# Patient Record
Sex: FEMALE | Race: WHITE | URBAN - METROPOLITAN AREA
[De-identification: names, ages, dates, MRNs, and addresses within clinical notes are randomized per-mention and may not be internally consistent; named-entity substitution may affect disease eponyms.]

---

## 2019-04-17 ENCOUNTER — Encounter (OUTPATIENT)
Dept: URBAN - METROPOLITAN AREA CLINIC 71 | Facility: CLINIC | Age: 64
End: 2019-04-17
Payer: COMMERCIAL

## 2019-04-17 PROCEDURE — 92012 INTRM OPH EXAM EST PATIENT: CPT | Performed by: OPTOMETRIST

## 2019-05-17 ENCOUNTER — NEW PATIENT (OUTPATIENT)
Dept: URBAN - METROPOLITAN AREA CLINIC 56 | Facility: CLINIC | Age: 64
End: 2019-05-17
Payer: COMMERCIAL

## 2019-05-17 DIAGNOSIS — H15.121 NODULAR EPISCLERITIS, RIGHT EYE: Primary | ICD-10-CM

## 2019-05-17 PROCEDURE — 92004 COMPRE OPH EXAM NEW PT 1/>: CPT | Performed by: OPTOMETRIST

## 2019-05-17 RX ORDER — LOTEPREDNOL ETABONATE 5 MG/G
0.5 % GEL OPHTHALMIC
Qty: 1 | Refills: 0 | Status: INACTIVE
Start: 2019-05-17 | End: 2019-09-16

## 2019-05-17 ASSESSMENT — KERATOMETRY
OS: 44.79
OD: 45.80

## 2019-05-17 ASSESSMENT — INTRAOCULAR PRESSURE
OD: 19
OS: 15

## 2019-05-28 ENCOUNTER — Encounter (OUTPATIENT)
Dept: URBAN - METROPOLITAN AREA CLINIC 71 | Facility: CLINIC | Age: 64
End: 2019-05-28
Payer: COMMERCIAL

## 2019-05-28 PROCEDURE — 99214 OFFICE O/P EST MOD 30 MIN: CPT | Performed by: OPTOMETRIST

## 2019-07-09 ENCOUNTER — Encounter (OUTPATIENT)
Dept: URBAN - METROPOLITAN AREA CLINIC 71 | Facility: CLINIC | Age: 64
End: 2019-07-09
Payer: COMMERCIAL

## 2019-07-09 PROCEDURE — 92012 INTRM OPH EXAM EST PATIENT: CPT | Performed by: OPTOMETRIST

## 2019-09-16 ENCOUNTER — FOLLOW UP ESTABLISHED (OUTPATIENT)
Dept: URBAN - METROPOLITAN AREA CLINIC 56 | Facility: CLINIC | Age: 64
End: 2019-09-16
Payer: COMMERCIAL

## 2019-09-16 PROCEDURE — 92014 COMPRE OPH EXAM EST PT 1/>: CPT | Performed by: OPTOMETRIST

## 2019-09-16 ASSESSMENT — KERATOMETRY
OD: 45.61
OS: 44.35

## 2019-09-16 ASSESSMENT — INTRAOCULAR PRESSURE
OS: 17
OD: 18

## 2019-09-16 ASSESSMENT — VISUAL ACUITY
OS: 20/20
OD: 20/20

## 2020-09-11 ENCOUNTER — OFFICE VISIT (OUTPATIENT)
Dept: URBAN - METROPOLITAN AREA CLINIC 71 | Facility: CLINIC | Age: 65
End: 2020-09-11
Payer: COMMERCIAL

## 2020-09-11 DIAGNOSIS — H52.03 HYPERMETROPIA, BILATERAL: Primary | ICD-10-CM

## 2020-09-11 PROCEDURE — 92014 COMPRE OPH EXAM EST PT 1/>: CPT | Performed by: OPTOMETRIST

## 2020-09-11 ASSESSMENT — VISUAL ACUITY
OD: 20/25
OS: 20/20

## 2020-09-11 ASSESSMENT — INTRAOCULAR PRESSURE
OD: 14
OS: 15

## 2020-09-22 ENCOUNTER — OFFICE VISIT (OUTPATIENT)
Dept: URBAN - METROPOLITAN AREA CLINIC 71 | Facility: CLINIC | Age: 65
End: 2020-09-22
Payer: COMMERCIAL

## 2020-09-22 DIAGNOSIS — H10.013 ACUTE FOLLICULAR CONJUNCTIVITIS, BILATERAL: ICD-10-CM

## 2020-09-22 PROCEDURE — 92012 INTRM OPH EXAM EST PATIENT: CPT | Performed by: OPHTHALMOLOGY

## 2020-09-22 RX ORDER — TOBRAMYCIN AND DEXAMETHASONE 3; 1 MG/ML; MG/ML
SUSPENSION/ DROPS OPHTHALMIC
Qty: 1 | Refills: 0 | Status: INACTIVE
Start: 2020-09-22 | End: 2021-05-12

## 2020-09-22 ASSESSMENT — KERATOMETRY
OS: 44.88
OD: 45.75

## 2020-09-22 ASSESSMENT — INTRAOCULAR PRESSURE
OS: 9
OD: 10

## 2020-09-22 NOTE — IMPRESSION/PLAN
Impression: Acute follicular conjunctivitis, bilateral: H10.013. Plan: Will start patient on Tobradex QID OU for 10 days. Recommend patient to use warm compresses 3-4x daily for 5-10 minutes.

## 2020-09-22 NOTE — IMPRESSION/PLAN
Impression: Dry eye syndrome of bilateral lacrimal glands: H04.123. Plan: Likely the cause of patient's complaints. Recommend patient to use artificial tears OU especially while reading or focusing. Patient to return in 4 weeks to see if there is improvement. If worsening occurs, recommend patient to call in.

## 2020-10-20 ENCOUNTER — OFFICE VISIT (OUTPATIENT)
Dept: URBAN - METROPOLITAN AREA CLINIC 71 | Facility: CLINIC | Age: 65
End: 2020-10-20
Payer: COMMERCIAL

## 2020-10-20 DIAGNOSIS — H04.123 DRY EYE SYNDROME OF BILATERAL LACRIMAL GLANDS: Primary | ICD-10-CM

## 2020-10-20 PROCEDURE — 99212 OFFICE O/P EST SF 10 MIN: CPT | Performed by: OPHTHALMOLOGY

## 2020-10-20 ASSESSMENT — KERATOMETRY
OS: 44.75
OD: 45.50

## 2020-10-20 ASSESSMENT — INTRAOCULAR PRESSURE
OD: 16
OS: 16

## 2020-10-20 NOTE — IMPRESSION/PLAN
Impression: Dry eye syndrome of bilateral lacrimal glands: H04.123. Plan: Continue use of artificial tears, especially while reading or doing any actives that involve focusing, such as watching tv. Patient to call if any symptoms worsen.

## 2020-10-20 NOTE — IMPRESSION/PLAN
Impression: Age-related nuclear cataract, bilateral: H25.13. Stable. Plan: Observe. Return as scheduled with Dr. Matthew Moon.

## 2021-05-12 ENCOUNTER — OFFICE VISIT (OUTPATIENT)
Dept: URBAN - METROPOLITAN AREA CLINIC 56 | Facility: CLINIC | Age: 66
End: 2021-05-12
Payer: COMMERCIAL

## 2021-05-12 PROCEDURE — 92014 COMPRE OPH EXAM EST PT 1/>: CPT | Performed by: OPTOMETRIST

## 2021-05-12 PROCEDURE — 92134 CPTRZ OPH DX IMG PST SGM RTA: CPT | Performed by: OPTOMETRIST

## 2021-05-12 ASSESSMENT — VISUAL ACUITY
OD: 20/25
OS: 20/25

## 2021-05-12 ASSESSMENT — KERATOMETRY
OD: 45.48
OS: 44.89

## 2021-05-12 ASSESSMENT — INTRAOCULAR PRESSURE
OS: 15
OD: 15

## 2021-05-12 NOTE — IMPRESSION/PLAN
Impression: Combined forms of age-related cataract, bilateral Plan: Cataracts are affecting vision at this time. Recommend cataract surgery when they are visually significant. Patient education can have surgery at any time. RTC 1 year or sooner if necessary.

## 2021-08-30 ENCOUNTER — OFFICE VISIT (OUTPATIENT)
Dept: URBAN - METROPOLITAN AREA CLINIC 71 | Facility: CLINIC | Age: 66
End: 2021-08-30
Payer: COMMERCIAL

## 2021-08-30 DIAGNOSIS — S05.02XA CORNEAL ABRASION W/O FB OF LEFT EYE, INITIAL ENCOUNTER: Primary | ICD-10-CM

## 2021-08-30 DIAGNOSIS — H25.13 NUCLEAR SCLEROSIS CATARACT, BILATERAL: ICD-10-CM

## 2021-08-30 DIAGNOSIS — H17.89 OTHER CORNEAL SCARS AND OPACITIES: ICD-10-CM

## 2021-08-30 PROCEDURE — 99214 OFFICE O/P EST MOD 30 MIN: CPT | Performed by: OPTOMETRIST

## 2021-08-30 ASSESSMENT — INTRAOCULAR PRESSURE
OS: 13
OD: 15

## 2021-08-30 NOTE — IMPRESSION/PLAN
Impression: Corneal abrasion w/o FB of left eye, initial encounter: S05.02xA. superficial only. Epi fully intact. Plan: Discussed findings in detail with pt. Continue Thera Tears or other lubricating drops for comfort. No additional treatment recommended at this time. Call if new or worsening symptoms.

## 2021-09-13 ENCOUNTER — OFFICE VISIT (OUTPATIENT)
Dept: URBAN - METROPOLITAN AREA CLINIC 71 | Facility: CLINIC | Age: 66
End: 2021-09-13
Payer: COMMERCIAL

## 2021-09-13 DIAGNOSIS — S05.02XD CORNEAL ABRASION W/O FB OF LT EYE, SUBSEQUENT ENCOUNTER: ICD-10-CM

## 2021-09-13 PROCEDURE — 92015 DETERMINE REFRACTIVE STATE: CPT | Performed by: OPTOMETRIST

## 2021-09-13 PROCEDURE — 92014 COMPRE OPH EXAM EST PT 1/>: CPT | Performed by: OPTOMETRIST

## 2021-09-13 ASSESSMENT — INTRAOCULAR PRESSURE
OD: 12
OS: 13

## 2021-09-13 NOTE — IMPRESSION/PLAN
Impression: Nuclear sclerosis cataract, bilateral: H25.13. OD>OS Plan: Cataracts affecting vision some, but no surgery is currently recommended. The patient will monitor vision changes and contact us with any decrease in vision. Continue to monitor. Okay to schedule cataract evaluation at patient's request if she feels glasses ar no longer adequate.

## 2021-09-13 NOTE — IMPRESSION/PLAN
Impression: Dry eye syndrome of bilateral lacrimal glands: H04.123. Plan: Discussed dry eye disease. Use artificial tears up to QID. Call if worsens or no improvement.

## 2021-09-13 NOTE — IMPRESSION/PLAN
Impression: Presbyopia: H52.4. patient's vision with habitual glasses is already 20/40 OD and OS. Vision corrects into 20/20 (OS) with a change in prescription. Plan: A glasses Rx has not been given to pt today. Pt would like to wait 2-4 weeks to recheck her vision due to fluctuations. Okay to schedule refraction at pt's request. Due to 20/40 vision with habitual glasses, there are no work or driving restrictions recommended.

## 2021-09-13 NOTE — IMPRESSION/PLAN
Impression: Corneal abrasion w/o FB of lt eye, subsequent encounter: S05.02xD. Epi fully intact. Resolved. Plan: Discussed in detail with pt. No restrictions. Call with concerns or new symptoms.

## 2021-10-11 ENCOUNTER — OFFICE VISIT (OUTPATIENT)
Dept: URBAN - METROPOLITAN AREA CLINIC 71 | Facility: CLINIC | Age: 66
End: 2021-10-11
Payer: COMMERCIAL

## 2021-10-11 PROCEDURE — 92012 INTRM OPH EXAM EST PATIENT: CPT | Performed by: OPTOMETRIST

## 2021-10-11 ASSESSMENT — VISUAL ACUITY
OD: 20/20
OS: 20/20

## 2021-10-11 ASSESSMENT — INTRAOCULAR PRESSURE
OD: 13
OS: 12

## 2021-10-11 NOTE — IMPRESSION/PLAN
Impression: Presbyopia: H52.4. Bilateral. Current lenses are in poor condition. Plan: A glasses prescription has been discussed and generated. Patient to call with any concerns.

## 2021-11-01 ENCOUNTER — OFFICE VISIT (OUTPATIENT)
Dept: URBAN - METROPOLITAN AREA CLINIC 71 | Facility: CLINIC | Age: 66
End: 2021-11-01

## 2021-11-01 DIAGNOSIS — H52.4 PRESBYOPIA: Primary | ICD-10-CM

## 2021-11-01 PROCEDURE — 92015 DETERMINE REFRACTIVE STATE: CPT | Performed by: OPTOMETRIST

## 2021-11-01 PROCEDURE — V2799 MISC VISION ITEM OR SERVICE: HCPCS | Performed by: OPTOMETRIST

## 2021-11-01 ASSESSMENT — KERATOMETRY
OS: 44.75
OD: 45.50

## 2021-11-01 ASSESSMENT — INTRAOCULAR PRESSURE
OD: 14
OS: 13

## 2021-11-18 ENCOUNTER — OFFICE VISIT (OUTPATIENT)
Dept: URBAN - METROPOLITAN AREA CLINIC 56 | Facility: CLINIC | Age: 66
End: 2021-11-18
Payer: COMMERCIAL

## 2021-11-18 PROCEDURE — 99204 OFFICE O/P NEW MOD 45 MIN: CPT | Performed by: OPHTHALMOLOGY

## 2021-11-18 PROCEDURE — 92134 CPTRZ OPH DX IMG PST SGM RTA: CPT | Performed by: OPHTHALMOLOGY

## 2021-11-18 ASSESSMENT — KERATOMETRY
OD: 45.55
OS: 44.73

## 2021-11-18 ASSESSMENT — VISUAL ACUITY
OS: 20/20
OD: 20/20

## 2021-11-18 NOTE — IMPRESSION/PLAN
Impression: Nuclear sclerosis cataract, bilateral: H25.13. OD>OS Plan: Patient will continue to monitor vision. Patient will return for CEE or if notes any sudden changes in vision or loss of vision. 
dilated exam next available

## 2021-11-18 NOTE — IMPRESSION/PLAN
Impression: Dry eye syndrome of bilateral lacrimal glands: H04.123. Plan: Discussed daily artificial tears to improve with dry eye, and informed can purchase eye  drops over the counter , three to four times daily. 
Schedule for Punctul plugs next available

## 2021-11-18 NOTE — IMPRESSION/PLAN
Impression: Corneal abrasion w/o FB of lt eye, subsequent encounter: S05.02xD. Epi fully intact. Resolved.  Plan: Monitor

## 2022-01-06 ENCOUNTER — OFFICE VISIT (OUTPATIENT)
Dept: URBAN - METROPOLITAN AREA CLINIC 56 | Facility: CLINIC | Age: 67
End: 2022-01-06
Payer: COMMERCIAL

## 2022-01-06 PROCEDURE — 92134 CPTRZ OPH DX IMG PST SGM RTA: CPT | Performed by: OPHTHALMOLOGY

## 2022-01-06 PROCEDURE — 68761 CLOSE TEAR DUCT OPENING: CPT | Performed by: OPHTHALMOLOGY

## 2022-01-06 PROCEDURE — 99213 OFFICE O/P EST LOW 20 MIN: CPT | Performed by: OPHTHALMOLOGY

## 2022-01-06 ASSESSMENT — VISUAL ACUITY
OS: 20/20
OD: 20/20

## 2022-01-06 ASSESSMENT — INTRAOCULAR PRESSURE
OD: 14
OS: 14

## 2022-01-06 ASSESSMENT — KERATOMETRY
OS: 44.67
OD: 45.52

## 2022-01-06 NOTE — IMPRESSION/PLAN
Impression: Nuclear sclerosis cataract, bilateral: H25.13. OD>OS Plan: Patient will continue to monitor vision. Patient will return for CEE or if notes any sudden changes in vision or loss of vision.

## 2022-01-06 NOTE — IMPRESSION/PLAN
Impression: Dry eye syndrome of bilateral lacrimal glands: H04.123. Plan: Discussed daily artificial tears to improve with dry eye, and informed can purchase eye  drops over the counter , three to four times daily. .
Punctal plugs inserted in both lower lids today. 
(Extended Wear plugs 0.4mm / LOT [id number], 2025-09-08)

## 2022-02-04 ENCOUNTER — OFFICE VISIT (OUTPATIENT)
Dept: URBAN - METROPOLITAN AREA CLINIC 10 | Facility: CLINIC | Age: 67
End: 2022-02-04
Payer: COMMERCIAL

## 2022-02-04 PROCEDURE — 99213 OFFICE O/P EST LOW 20 MIN: CPT | Performed by: OPHTHALMOLOGY

## 2022-02-04 RX ORDER — METHYLPREDNISOLONE 4 MG/1
4 MG TABLET ORAL
Qty: 1 | Refills: 0 | Status: ACTIVE
Start: 2022-02-04

## 2022-02-04 NOTE — IMPRESSION/PLAN
Impression: Dry eye syndrome of bilateral lacrimal glands: H04.123. Plan: Discussed daily artificial tears to improve with dry eye, and informed can purchase eye  drops over the counter , three to four times daily. Punctal plugs are not in place OU. 
Gave rx for Medrol dose pack

## 2022-03-10 ENCOUNTER — OFFICE VISIT (OUTPATIENT)
Dept: URBAN - METROPOLITAN AREA CLINIC 56 | Facility: CLINIC | Age: 67
End: 2022-03-10
Payer: COMMERCIAL

## 2022-03-10 PROCEDURE — 92012 INTRM OPH EXAM EST PATIENT: CPT | Performed by: OPHTHALMOLOGY

## 2022-03-10 ASSESSMENT — VISUAL ACUITY
OD: 20/25
OS: 20/25

## 2022-03-10 ASSESSMENT — INTRAOCULAR PRESSURE
OD: 13
OS: 14

## 2022-03-15 ENCOUNTER — OFFICE VISIT (OUTPATIENT)
Dept: URBAN - METROPOLITAN AREA CLINIC 75 | Facility: CLINIC | Age: 67
End: 2022-03-15
Payer: COMMERCIAL

## 2022-03-15 DIAGNOSIS — H25.813 COMBINED FORMS OF AGE-RELATED CATARACT, BILATERAL: Primary | ICD-10-CM

## 2022-03-15 PROCEDURE — 92134 CPTRZ OPH DX IMG PST SGM RTA: CPT | Performed by: OPTOMETRIST

## 2022-03-15 PROCEDURE — 99213 OFFICE O/P EST LOW 20 MIN: CPT | Performed by: OPTOMETRIST

## 2022-03-15 ASSESSMENT — INTRAOCULAR PRESSURE
OD: 14
OS: 16

## 2022-03-15 ASSESSMENT — VISUAL ACUITY
OS: 20/20
OD: 20/30

## 2022-03-15 NOTE — IMPRESSION/PLAN
Impression: Presbyopia: H52.4. Plan: New glasses Rx were distributed today. Patient to call office with any questions or concerns. PRN. 1yr w/Ref GLS.
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
## 2022-04-25 ENCOUNTER — OFFICE VISIT (OUTPATIENT)
Dept: URBAN - METROPOLITAN AREA CLINIC 75 | Facility: CLINIC | Age: 67
End: 2022-04-25
Payer: COMMERCIAL

## 2022-04-25 DIAGNOSIS — H04.123 DRY EYE SYNDROME OF BILATERAL LACRIMAL GLANDS: Primary | ICD-10-CM

## 2022-04-25 DIAGNOSIS — H34.8322 TRIBUTARY BRANCH RETINAL VEIN OCCLUSION OF LEFT EYE, STABLE: ICD-10-CM

## 2022-04-25 DIAGNOSIS — H25.813 COMBINED FORMS OF AGE-RELATED CATARACT, BILATERAL: ICD-10-CM

## 2022-04-25 PROCEDURE — 99213 OFFICE O/P EST LOW 20 MIN: CPT | Performed by: OPTOMETRIST

## 2022-04-25 ASSESSMENT — INTRAOCULAR PRESSURE
OS: 11
OD: 13

## 2022-04-25 NOTE — IMPRESSION/PLAN
Impression: Tributary branch retinal vein occlusion of left eye, stable: G48.7302. Plan: Discussed diagnosis in detail with patient. Optos ordered. Appears to be healing. Pt states she has recently started seeing PCP for blood pressure and cholesterol. Pt denies ever losing vision.
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
## 2022-06-09 ENCOUNTER — OFFICE VISIT (OUTPATIENT)
Dept: URBAN - METROPOLITAN AREA CLINIC 75 | Facility: CLINIC | Age: 67
End: 2022-06-09
Payer: COMMERCIAL

## 2022-06-09 DIAGNOSIS — H47.10 PAPILLEDEMA: ICD-10-CM

## 2022-06-09 DIAGNOSIS — H34.8322 TRIBUTARY BRANCH RETINAL VEIN OCCLUSION OF LEFT EYE, STABLE: Primary | ICD-10-CM

## 2022-06-09 DIAGNOSIS — H40.023 OPEN ANGLE WITH BORDERLINE FINDINGS, HIGH RISK, BILATERAL: ICD-10-CM

## 2022-06-09 PROCEDURE — 99213 OFFICE O/P EST LOW 20 MIN: CPT | Performed by: OPTOMETRIST

## 2022-06-09 PROCEDURE — 92083 EXTENDED VISUAL FIELD XM: CPT | Performed by: OPTOMETRIST

## 2022-06-09 ASSESSMENT — INTRAOCULAR PRESSURE
OS: 11
OD: 12

## 2022-06-09 NOTE — IMPRESSION/PLAN
Impression: Tributary branch retinal vein occlusion of left eye, stable: O48.6666. OPTOS ordered and performed Plan: Branch Retinal Vein Occlusion (BRVO) is a blockage of one or more branches of the central retinal vein, which runs through the optic nerve. Branch Retinal Vein Occlusion symptoms include: Peripheral vision loss. Blurred or distorted central vision.

## 2022-06-09 NOTE — IMPRESSION/PLAN
Impression: Open angle with borderline findings, high risk, bilateral: H40.023. VF 24-2 & OPTOS ordered and performed. OPTOS ordered and performed Plan: Discussed glaucoma suspects do not usually need to be treated but do need close follow up monitoring for early signs of glaucoma damage. If treatment is necessary then glaucoma eye drops and pills are utilized. Glaucoma suspects have some characteristics of glaucoma, but not all, and the findings may be very subtle. No treatment recommended at this time.  

Contact the office if: Any loss of vision, eye pain, cloudy vision, photophobia, or dark spots or clouds blocking portions of your vision occurs

## 2022-06-09 NOTE — IMPRESSION/PLAN
Impression: Papilledema: H47.10. Plan: Discussed. Patient recommended to see PCP for sleep study. Papilledema is a condition in which increased pressure in or around the brain causes the part of the optic nerve inside the eye to swell. Symptoms may be fleeting disturbances in vision, headache, vomiting, or a combination.

## 2022-06-10 ENCOUNTER — OFFICE VISIT (OUTPATIENT)
Dept: URBAN - METROPOLITAN AREA CLINIC 70 | Facility: CLINIC | Age: 67
End: 2022-06-10
Payer: COMMERCIAL

## 2022-06-10 DIAGNOSIS — H34.8320 BRANCH RETINAL VEIN OCCLUSION W/ MACULAR EDEMA, LEFT EYE: Primary | ICD-10-CM

## 2022-06-10 PROCEDURE — 99214 OFFICE O/P EST MOD 30 MIN: CPT | Performed by: OPHTHALMOLOGY

## 2022-06-10 PROCEDURE — 67028 INJECTION EYE DRUG: CPT | Performed by: OPHTHALMOLOGY

## 2022-06-10 PROCEDURE — 92235 FLUORESCEIN ANGRPH MLTIFRAME: CPT | Performed by: OPHTHALMOLOGY

## 2022-06-10 PROCEDURE — 92134 CPTRZ OPH DX IMG PST SGM RTA: CPT | Performed by: OPHTHALMOLOGY

## 2022-06-10 ASSESSMENT — INTRAOCULAR PRESSURE
OD: 15
OS: 15

## 2022-06-10 NOTE — IMPRESSION/PLAN
Impression: Branch retinal vein occlusion w/ macular edema, left eye: D75.0443. Plan: Exam and OCT reveal BRVO with CME, inject Avastin today, micro pulse laser mac grid for CME.   BRVO does not need a major medical workup the 2 common causes are blood pressure and aging

## 2022-06-18 ENCOUNTER — OFFICE VISIT (OUTPATIENT)
Dept: URBAN - METROPOLITAN AREA CLINIC 71 | Facility: CLINIC | Age: 67
End: 2022-06-18
Payer: COMMERCIAL

## 2022-06-18 DIAGNOSIS — H34.8320 TRIBUTARY (BRANCH) RETINAL VEIN OCCLUSION, LEFT EYE, WITH MACULAR EDEMA: Primary | ICD-10-CM

## 2022-06-18 PROCEDURE — 99212 OFFICE O/P EST SF 10 MIN: CPT

## 2022-06-18 ASSESSMENT — INTRAOCULAR PRESSURE
OS: 12
OD: 13

## 2022-06-22 ENCOUNTER — OFFICE VISIT (OUTPATIENT)
Dept: URBAN - METROPOLITAN AREA CLINIC 71 | Facility: CLINIC | Age: 67
End: 2022-06-22
Payer: COMMERCIAL

## 2022-06-22 DIAGNOSIS — H25.813 COMBINED FORMS OF AGE-RELATED CATARACT, BILATERAL: ICD-10-CM

## 2022-06-22 DIAGNOSIS — H34.8320 TRIBUTARY (BRANCH) RETINAL VEIN OCCLUSION, LEFT EYE, WITH MACULAR EDEMA: Primary | ICD-10-CM

## 2022-06-22 PROCEDURE — 99214 OFFICE O/P EST MOD 30 MIN: CPT | Performed by: OPTOMETRIST

## 2022-06-22 ASSESSMENT — INTRAOCULAR PRESSURE
OD: 14
OS: 11

## 2022-06-22 NOTE — IMPRESSION/PLAN
Impression: Combined forms of age-related cataract, bilateral: H25.813. Plan: Cataracts are progressing and they are interfering with her vision. Discussed we should wait until after the retina is stable and then we can consider CAT SX in the future. No treatment currently recommended. The patient will monitor vision changes and contact us with any decrease in vision.

## 2022-06-22 NOTE — IMPRESSION/PLAN
Impression: Tributary (branch) retinal vein occlusion, left eye, with macular edema: H57.4076. Plan: BRVO noted on OPTOS and exam today. currently being monitored and treated  by Dr. Aries Wang. Recommend PT continue to follow with him for treatment and once the retina is stable then we can re-visit glasses or cataracts.

## 2022-07-05 ENCOUNTER — OFFICE VISIT (OUTPATIENT)
Dept: URBAN - METROPOLITAN AREA CLINIC 75 | Facility: CLINIC | Age: 67
End: 2022-07-05
Payer: COMMERCIAL

## 2022-07-05 DIAGNOSIS — H34.8320 TRIBUTARY (BRANCH) RETINAL VEIN OCCLUSION, LEFT EYE, WITH MACULAR EDEMA: ICD-10-CM

## 2022-07-05 DIAGNOSIS — H40.013 OPEN ANGLE WITH BORDERLINE FINDINGS, LOW RISK, BILATERAL: Primary | ICD-10-CM

## 2022-07-05 DIAGNOSIS — H43.823 VITREOMACULAR ADHESION, BILATERAL: ICD-10-CM

## 2022-07-05 DIAGNOSIS — H47.10 PAPILLEDEMA: ICD-10-CM

## 2022-07-05 PROCEDURE — 92134 CPTRZ OPH DX IMG PST SGM RTA: CPT | Performed by: OPTOMETRIST

## 2022-07-05 PROCEDURE — 99213 OFFICE O/P EST LOW 20 MIN: CPT | Performed by: OPTOMETRIST

## 2022-07-05 ASSESSMENT — INTRAOCULAR PRESSURE
OS: 9
OD: 10

## 2022-07-05 NOTE — IMPRESSION/PLAN
Impression: Papilledema: H47.10. Plan: Discussed. Papilledema is a condition in which increased pressure in or around the brain causes the part of the optic nerve inside the eye to swell. Symptoms may be fleeting disturbances in vision, headache, vomiting, or a combination.

## 2022-07-05 NOTE — IMPRESSION/PLAN
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 OCT ordered and performed Plan: Vitreomacular adhesion (VMA) is a human medical condition where the vitreous gel (or simply vitreous, AKA vitreous humour) of the human eye adheres to the retina in an abnormally strong manner. As the eye ages, it is common for the vitreous to separate from the retina. Monitor. Patient to contact office with any questions or concerns.

## 2022-07-05 NOTE — IMPRESSION/PLAN
Impression: Tributary (branch) retinal vein occlusion, left eye, with macular edema: X56.0885. OCT ordered and performed Plan: Branch Retinal Vein Occlusion (BRVO) is a blockage of one or more branches of the central retinal vein, which runs through the optic nerve. Branch Retinal Vein Occlusion symptoms include: Peripheral vision loss. Blurred or distorted central vision.

## 2022-07-29 ENCOUNTER — OFFICE VISIT (OUTPATIENT)
Dept: URBAN - METROPOLITAN AREA CLINIC 70 | Facility: CLINIC | Age: 67
End: 2022-07-29
Payer: COMMERCIAL

## 2022-07-29 DIAGNOSIS — H34.8320 BRANCH RETINAL VEIN OCCLUSION W/ MACULAR EDEMA, LEFT EYE: Primary | ICD-10-CM

## 2022-07-29 PROCEDURE — 67028 INJECTION EYE DRUG: CPT | Performed by: OPHTHALMOLOGY

## 2022-07-29 PROCEDURE — 92134 CPTRZ OPH DX IMG PST SGM RTA: CPT | Performed by: OPHTHALMOLOGY

## 2022-07-29 PROCEDURE — 67210 TREATMENT OF RETINAL LESION: CPT | Performed by: OPHTHALMOLOGY

## 2022-07-29 ASSESSMENT — INTRAOCULAR PRESSURE
OD: 11
OS: 12

## 2022-07-29 NOTE — IMPRESSION/PLAN
Impression: Branch retinal vein occlusion w/ macular edema, left eye: J53.3391. Plan: Exam and OCT reveal BRVO with CME. Added micro pulse laser mac grid for CME OS today . BRVO does not need a major medical workup the 2 common causes are blood pressure and aging.   AVASTIN inject today for CME

## 2022-08-05 ENCOUNTER — OFFICE VISIT (OUTPATIENT)
Dept: URBAN - METROPOLITAN AREA CLINIC 71 | Facility: CLINIC | Age: 67
End: 2022-08-05
Payer: COMMERCIAL

## 2022-08-05 DIAGNOSIS — H34.8320 BRANCH RETINAL VEIN OCCLUSION W/ MACULAR EDEMA, LEFT EYE: Primary | ICD-10-CM

## 2022-08-05 PROCEDURE — 99213 OFFICE O/P EST LOW 20 MIN: CPT

## 2022-08-05 ASSESSMENT — INTRAOCULAR PRESSURE
OS: 13
OD: 13

## 2022-08-05 NOTE — IMPRESSION/PLAN
Impression: Branch retinal vein occlusion w/ macular edema, left eye: A18.8002. Optos ordered and reviewed. Condition is stable currently. Plan: Discussed Optos findings with patient. Discussed in detail to give more time since patient just met with Dr. Rylie Domingo for Micro Pulse laser and injection for treatment. Advised patient that Avastin injection should not give any systemic reactions and is ok to continue with treatment. Patient to continue to monitor vision and if any changes in vision occur to call office immediately. Discussed the possibility to have injections or laser treatments may be a lifelong thing or potentially only 1-2 more laser/injection treatment but, to continue care as scheduled with Dr. Rylie Domingo. Patient voices understanding.

## 2022-08-25 ENCOUNTER — OFFICE VISIT (OUTPATIENT)
Dept: URBAN - METROPOLITAN AREA CLINIC 71 | Facility: CLINIC | Age: 67
End: 2022-08-25
Payer: COMMERCIAL

## 2022-08-25 DIAGNOSIS — H25.813 COMBINED FORMS OF AGE-RELATED CATARACT, BILATERAL: ICD-10-CM

## 2022-08-25 DIAGNOSIS — H34.8320 BRANCH RETINAL VEIN OCCLUSION W/ MACULAR EDEMA, LEFT EYE: Primary | ICD-10-CM

## 2022-08-25 PROCEDURE — 99213 OFFICE O/P EST LOW 20 MIN: CPT | Performed by: OPTOMETRIST

## 2022-08-25 PROCEDURE — 92134 CPTRZ OPH DX IMG PST SGM RTA: CPT | Performed by: OPTOMETRIST

## 2022-08-25 ASSESSMENT — INTRAOCULAR PRESSURE
OS: 12
OD: 13

## 2022-08-25 NOTE — IMPRESSION/PLAN
Impression: Branch retinal vein occlusion w/ macular edema, left eye: V51.3222. BRVO not secondary to trauma. Pt reports flickers are worsening. Pt is being monitored by Dr. Bria Laura and scheduled to see him tomorrow, as well as [place] with Dr. Alfreda Canavan 10/2022. Plan: OCT mac ordered and performed today. Discussed in detail with pt. Continue care as scheduled with Dr. Bria Laura.

## 2022-08-26 ENCOUNTER — OFFICE VISIT (OUTPATIENT)
Dept: URBAN - METROPOLITAN AREA CLINIC 70 | Facility: CLINIC | Age: 67
End: 2022-08-26
Payer: COMMERCIAL

## 2022-08-26 PROCEDURE — 92134 CPTRZ OPH DX IMG PST SGM RTA: CPT | Performed by: OPHTHALMOLOGY

## 2022-08-26 PROCEDURE — 99212 OFFICE O/P EST SF 10 MIN: CPT | Performed by: OPHTHALMOLOGY

## 2022-08-26 PROCEDURE — 92235 FLUORESCEIN ANGRPH MLTIFRAME: CPT | Performed by: OPHTHALMOLOGY

## 2022-08-26 ASSESSMENT — INTRAOCULAR PRESSURE
OS: 12
OD: 14

## 2022-08-26 NOTE — IMPRESSION/PLAN
Impression: Branch retinal vein occlusion w/ macular edema, left eye: N22.2861. Plan: Exam and OCT reveal BRVO with CME. POST  micro pulse laser mac grid for CME OS today . BRVO does not need a major medical workup the 2 common causes are blood pressure and aging.   AVASTIN inject 1 month ago  for CME

## 2022-10-13 ENCOUNTER — OFFICE VISIT (OUTPATIENT)
Dept: URBAN - METROPOLITAN AREA CLINIC 75 | Facility: CLINIC | Age: 67
End: 2022-10-13
Payer: COMMERCIAL

## 2022-10-13 DIAGNOSIS — H34.8320 TRIBUTARY (BRANCH) RETINAL VEIN OCCLUSION, LEFT EYE, WITH MACULAR EDEMA: Primary | ICD-10-CM

## 2022-10-13 DIAGNOSIS — H25.813 COMBINED FORMS OF AGE-RELATED CATARACT, BILATERAL: ICD-10-CM

## 2022-10-13 DIAGNOSIS — H40.013 OPEN ANGLE WITH BORDERLINE FINDINGS, LOW RISK, BILATERAL: ICD-10-CM

## 2022-10-13 PROCEDURE — 99214 OFFICE O/P EST MOD 30 MIN: CPT | Performed by: OPTOMETRIST

## 2022-10-13 ASSESSMENT — INTRAOCULAR PRESSURE
OS: 5
OD: 8

## 2022-10-13 NOTE — IMPRESSION/PLAN
Impression: Open angle with borderline findings, low risk, bilateral: H40.013. Plan: Discussed glaucoma suspects do not usually need to be treated but do need close follow up monitoring for early signs of glaucoma damage. If treatment is necessary then glaucoma eye drops and pills are utilized. Glaucoma suspects have some characteristics of glaucoma, but not all, and the findings may be very subtle. No drop treatment recommended at this time. Pt recommended green healthy diet with supplemental vitamins to promote eye health.   

Contact the office if: Any loss of vision, eye pain, cloudy vision, photophobia, or dark spots or clouds blocking portions of your vision occurs

## 2022-10-13 NOTE — IMPRESSION/PLAN
Impression: Branch retinal vein occlusion w/ macular edema, left eye: O11.2304. Optos ordered and reviewed. Condition is stable currently. Plan: Discussed Optos findings with patient. Discussed in detail resolving signs and symptoms due to slow improvement. Stable scarring Recommended pt continue injections as scheduled with Dr. Juwan Loredo

## 2022-10-21 ENCOUNTER — OFFICE VISIT (OUTPATIENT)
Dept: URBAN - METROPOLITAN AREA CLINIC 70 | Facility: CLINIC | Age: 67
End: 2022-10-21
Payer: COMMERCIAL

## 2022-10-21 DIAGNOSIS — H34.8320 BRANCH RETINAL VEIN OCCLUSION W/ MACULAR EDEMA, LEFT EYE: Primary | ICD-10-CM

## 2022-10-21 PROCEDURE — 92134 CPTRZ OPH DX IMG PST SGM RTA: CPT | Performed by: OPHTHALMOLOGY

## 2022-10-21 PROCEDURE — 99212 OFFICE O/P EST SF 10 MIN: CPT | Performed by: OPHTHALMOLOGY

## 2022-10-21 ASSESSMENT — INTRAOCULAR PRESSURE
OS: 10
OD: 15

## 2022-10-21 NOTE — IMPRESSION/PLAN
Impression: Branch retinal vein occlusion w/ macular edema, left eye: P76.2891. Plan: Exam and OCT reveal BRVO with CME. s/p micro pulse laser mac grid for CME OS. BRVO does not need a major medical workup the 2 common causes are blood pressure and aging. AVASTIN injection July 2022 for CME.   Doing well,  Continue micropulse laser every 4 months

## 2022-12-02 ENCOUNTER — OFFICE VISIT (OUTPATIENT)
Dept: URBAN - METROPOLITAN AREA CLINIC 71 | Facility: CLINIC | Age: 67
End: 2022-12-02
Payer: COMMERCIAL

## 2022-12-02 DIAGNOSIS — H34.8320 BRANCH RETINAL VEIN OCCLUSION W/ MACULAR EDEMA, LEFT EYE: ICD-10-CM

## 2022-12-02 DIAGNOSIS — H02.889 MEIBOMIAN GLAND DYSFUNCTION OF EYE: ICD-10-CM

## 2022-12-02 DIAGNOSIS — H04.123 DRY EYE SYNDROME OF BILATERAL LACRIMAL GLANDS: ICD-10-CM

## 2022-12-02 DIAGNOSIS — H11.32 SUBCONJUNCTIVAL HEMORRHAGE OF LEFT EYE: Primary | ICD-10-CM

## 2022-12-02 DIAGNOSIS — H25.813 COMBINED FORMS OF AGE-RELATED CATARACT, BILATERAL: ICD-10-CM

## 2022-12-02 PROCEDURE — 99213 OFFICE O/P EST LOW 20 MIN: CPT

## 2022-12-02 ASSESSMENT — INTRAOCULAR PRESSURE
OS: 15
OD: 17

## 2022-12-02 NOTE — IMPRESSION/PLAN
Impression: Branch retinal vein occlusion w/ macular edema, left eye: O68.3182. Plan: Branch Retinal Vein Occlusion (BRVO) is a blockage of one or more branches of the central retinal vein, which runs through the optic nerve. Branch Retinal Vein Occlusion symptoms include: Peripheral vision loss. Blurred or distorted central vision. Discussed Injections do not cause increased risk of retinal detachment.

## 2022-12-02 NOTE — IMPRESSION/PLAN
Impression: Meibomian gland dysfunction of eye: H02.889. Bilateral. Plan: Patient recommended to apply warm compresses at least QD for minimum 10 minutes. Lid scrubs and hygiene were explained. Patient instructed to use artificial tears as needed for comfort. Will continue to observe condition and or symptoms.

## 2022-12-02 NOTE — IMPRESSION/PLAN
Impression: Subconjunctival hemorrhage of left eye: H11.32. Plan: Discussed DX in detail with pt Explained that a subconj heme is a benign finding and is like a bruise on the eye. It may get worse before it gets better and it can take some time, possibly a few weeks, to fully resolve. Recommend pt to use AFT for comfort. 
Pt voices understanding

## 2022-12-16 ENCOUNTER — PROCEDURE (OUTPATIENT)
Dept: URBAN - METROPOLITAN AREA CLINIC 70 | Facility: CLINIC | Age: 67
End: 2022-12-16
Payer: COMMERCIAL

## 2022-12-16 DIAGNOSIS — H34.8320 BRANCH RETINAL VEIN OCCLUSION W/ MACULAR EDEMA, LEFT EYE: Primary | ICD-10-CM

## 2022-12-16 PROCEDURE — 92134 CPTRZ OPH DX IMG PST SGM RTA: CPT | Performed by: OPHTHALMOLOGY

## 2022-12-16 PROCEDURE — 67210 TREATMENT OF RETINAL LESION: CPT | Performed by: OPHTHALMOLOGY

## 2022-12-16 ASSESSMENT — INTRAOCULAR PRESSURE
OS: 15
OD: 15

## 2022-12-16 NOTE — IMPRESSION/PLAN
Impression: Branch retinal vein occlusion w/ macular edema, left eye: X53.1063. Plan: Exam and OCT reveal BRVO with CME. Added micro pulse laser mac grid for CME OS. BRVO does not need a major medical workup the 2 common causes are blood pressure and aging. AVASTIN injection July 2022 for CME.   Doing well,  Continue micropulse laser every 4 months

## 2022-12-29 ENCOUNTER — POST-OPERATIVE VISIT (OUTPATIENT)
Dept: URBAN - METROPOLITAN AREA CLINIC 71 | Facility: CLINIC | Age: 67
End: 2022-12-29
Payer: COMMERCIAL

## 2022-12-29 DIAGNOSIS — H43.812 VITREOUS DETACHMENT OF LEFT EYE: Primary | ICD-10-CM

## 2022-12-29 DIAGNOSIS — H34.8320 BRANCH RETINAL VEIN OCCLUSION W/ MACULAR EDEMA, LEFT EYE: ICD-10-CM

## 2022-12-29 DIAGNOSIS — H25.813 COMBINED FORMS OF AGE-RELATED CATARACT, BILATERAL: ICD-10-CM

## 2022-12-29 PROCEDURE — 99213 OFFICE O/P EST LOW 20 MIN: CPT | Performed by: PHYSICIAN ASSISTANT

## 2022-12-29 ASSESSMENT — INTRAOCULAR PRESSURE
OD: 14
OS: 14

## 2022-12-29 NOTE — IMPRESSION/PLAN
Impression: Branch retinal vein occlusion w/ macular edema, left eye: E08.8715. Plan: Appears stable. F/u with retina as scheduled.

## 2022-12-29 NOTE — IMPRESSION/PLAN
Impression: Vitreous detachment of left eye: H43.812. Plan: Posterior vitreous detachment present. There is no evidence of breaks or tears on today's exam. Pt declined dilation. Signs and risks of retinal detachment and tears were discussed. Patient instructed to call the office if any symptoms noted. Recommend the patient return to office for follow up with retina. Answered all questions and addressed all concerns. Suspect that pt will acquire new floater in the future. Optos ordered today showed no retinal pathology.

## 2022-12-29 NOTE — IMPRESSION/PLAN
Impression: Combined forms of age-related cataract, bilateral: H25.813. Plan: Noted. F/u with regular eye exams.

## 2023-04-05 ENCOUNTER — OFFICE VISIT (OUTPATIENT)
Dept: URBAN - METROPOLITAN AREA CLINIC 71 | Facility: CLINIC | Age: 68
End: 2023-04-05
Payer: MEDICARE

## 2023-04-05 DIAGNOSIS — H25.813 COMBINED FORMS OF AGE-RELATED CATARACT, BILATERAL: ICD-10-CM

## 2023-04-05 DIAGNOSIS — H34.8320 BRANCH RETINAL VEIN OCCLUSION W/ MACULAR EDEMA, LEFT EYE: Primary | ICD-10-CM

## 2023-04-05 DIAGNOSIS — H40.013 OPEN ANGLE WITH BORDERLINE FINDINGS, LOW RISK, BILATERAL: ICD-10-CM

## 2023-04-05 PROCEDURE — 99212 OFFICE O/P EST SF 10 MIN: CPT | Performed by: OPTOMETRIST

## 2023-04-05 ASSESSMENT — INTRAOCULAR PRESSURE
OD: 12
OS: 11
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
## 2023-04-05 NOTE — IMPRESSION/PLAN
Impression: Open angle with borderline findings, low risk, bilateral: H40.013. Pt being monitored by Dr. Stephen Rios. Plan: Continue care with Dr. Bruce Douglas as scheduled 04/20/23 for Glaucoma work-up.

## 2023-04-14 ENCOUNTER — OFFICE VISIT (OUTPATIENT)
Dept: URBAN - METROPOLITAN AREA CLINIC 71 | Facility: CLINIC | Age: 68
End: 2023-04-14
Payer: COMMERCIAL

## 2023-04-14 DIAGNOSIS — H34.8320 TRIBUTARY (BRANCH) RETINAL VEIN OCCLUSION, LEFT EYE, WITH MACULAR EDEMA: Primary | ICD-10-CM

## 2023-04-14 PROCEDURE — 92134 CPTRZ OPH DX IMG PST SGM RTA: CPT | Performed by: OPHTHALMOLOGY

## 2023-04-14 PROCEDURE — 99214 OFFICE O/P EST MOD 30 MIN: CPT | Performed by: OPHTHALMOLOGY

## 2023-04-14 ASSESSMENT — INTRAOCULAR PRESSURE
OD: 15
OS: 19

## 2023-04-14 NOTE — IMPRESSION/PLAN
Impression: Tributary (branch) retinal vein occlusion, left eye, with macular edema: C33.0986. Plan: Mild juxtafoveal CME, previously treated w/ Micropulse laser; RTC 4-6 weeks DFEx/OCT mac/FP/FA transit OS - may benefit from Ozurdex vs focal laser rather than re-start anti-VEGF therapy. GET AUTH FOR OZURDEX - questionable glaucoma history.

## 2023-06-29 ENCOUNTER — OFFICE VISIT (OUTPATIENT)
Dept: URBAN - METROPOLITAN AREA CLINIC 71 | Facility: CLINIC | Age: 68
End: 2023-06-29
Payer: COMMERCIAL

## 2023-06-29 DIAGNOSIS — H34.8320 TRIBUTARY (BRANCH) RETINAL VEIN OCCLUSION, LEFT EYE, WITH MACULAR EDEMA: Primary | ICD-10-CM

## 2023-06-29 PROCEDURE — 92134 CPTRZ OPH DX IMG PST SGM RTA: CPT | Performed by: OPHTHALMOLOGY

## 2023-06-29 PROCEDURE — 92242 FLUORESCEIN&ICG ANGIOGRAPHY: CPT | Performed by: OPHTHALMOLOGY

## 2023-06-29 PROCEDURE — 92250 FUNDUS PHOTOGRAPHY W/I&R: CPT | Performed by: OPHTHALMOLOGY

## 2023-06-29 PROCEDURE — 99214 OFFICE O/P EST MOD 30 MIN: CPT | Performed by: OPHTHALMOLOGY

## 2023-06-29 ASSESSMENT — INTRAOCULAR PRESSURE
OS: 14
OD: 19

## 2023-06-29 NOTE — IMPRESSION/PLAN
Impression: Tributary (branch) retinal vein occlusion, left eye, with macular edema: T03.7837. Plan: Mild juxtafoveal CME, previously treated w/ Micropulse laser; PT GOT AVASTIN INJECTION W/ DR Alta Perez, HOLD plans for primary OZURDEX today; RTC 1 month DFEx/OCT mac/FP/poss IVO OS.

## 2023-08-18 ENCOUNTER — OFFICE VISIT (OUTPATIENT)
Dept: URBAN - METROPOLITAN AREA CLINIC 71 | Facility: CLINIC | Age: 68
End: 2023-08-18
Payer: MEDICARE

## 2023-08-18 DIAGNOSIS — H34.8320 TRIBUTARY (BRANCH) RETINAL VEIN OCCLUSION, LEFT EYE, WITH MACULAR EDEMA: Primary | ICD-10-CM

## 2023-08-18 PROCEDURE — 92134 CPTRZ OPH DX IMG PST SGM RTA: CPT | Performed by: OPHTHALMOLOGY

## 2023-08-18 PROCEDURE — 67028 INJECTION EYE DRUG: CPT | Performed by: OPHTHALMOLOGY

## 2023-08-18 ASSESSMENT — INTRAOCULAR PRESSURE
OD: 15
OS: 17

## 2023-08-21 ENCOUNTER — OFFICE VISIT (OUTPATIENT)
Dept: URBAN - METROPOLITAN AREA CLINIC 71 | Facility: CLINIC | Age: 68
End: 2023-08-21
Payer: MEDICARE

## 2023-08-21 DIAGNOSIS — H34.8320 TRIBUTARY (BRANCH) RETINAL VEIN OCCLUSION, LEFT EYE, WITH MACULAR EDEMA: Primary | ICD-10-CM

## 2023-08-21 PROCEDURE — 99212 OFFICE O/P EST SF 10 MIN: CPT | Performed by: OPHTHALMOLOGY

## 2023-08-21 ASSESSMENT — INTRAOCULAR PRESSURE
OS: 15
OD: 13

## 2023-08-28 ENCOUNTER — OFFICE VISIT (OUTPATIENT)
Dept: URBAN - METROPOLITAN AREA CLINIC 71 | Facility: CLINIC | Age: 68
End: 2023-08-28
Payer: MEDICARE

## 2023-08-28 DIAGNOSIS — H34.8320 TRIBUTARY (BRANCH) RETINAL VEIN OCCLUSION, LEFT EYE, WITH MACULAR EDEMA: Primary | ICD-10-CM

## 2023-08-28 PROCEDURE — 99213 OFFICE O/P EST LOW 20 MIN: CPT

## 2023-08-28 ASSESSMENT — INTRAOCULAR PRESSURE
OS: 15
OD: 12

## 2023-09-29 ENCOUNTER — OFFICE VISIT (OUTPATIENT)
Dept: URBAN - METROPOLITAN AREA CLINIC 71 | Facility: CLINIC | Age: 68
End: 2023-09-29
Payer: MEDICARE

## 2023-09-29 DIAGNOSIS — H34.8320 TRIBUTARY (BRANCH) RETINAL VEIN OCCLUSION, LEFT EYE, WITH MACULAR EDEMA: Primary | ICD-10-CM

## 2023-09-29 PROCEDURE — 99214 OFFICE O/P EST MOD 30 MIN: CPT | Performed by: OPHTHALMOLOGY

## 2023-09-29 PROCEDURE — 92134 CPTRZ OPH DX IMG PST SGM RTA: CPT | Performed by: OPHTHALMOLOGY

## 2023-09-29 ASSESSMENT — INTRAOCULAR PRESSURE
OD: 17
OS: 19

## 2023-12-08 ENCOUNTER — OFFICE VISIT (OUTPATIENT)
Dept: URBAN - METROPOLITAN AREA CLINIC 71 | Facility: CLINIC | Age: 68
End: 2023-12-08
Payer: MEDICARE

## 2023-12-08 DIAGNOSIS — H34.8320 TRIBUTARY (BRANCH) RETINAL VEIN OCCLUSION, LEFT EYE, WITH MACULAR EDEMA: Primary | ICD-10-CM

## 2023-12-08 PROCEDURE — 99214 OFFICE O/P EST MOD 30 MIN: CPT | Performed by: OPHTHALMOLOGY

## 2023-12-08 PROCEDURE — 92250 FUNDUS PHOTOGRAPHY W/I&R: CPT | Performed by: OPHTHALMOLOGY

## 2023-12-08 PROCEDURE — 92235 FLUORESCEIN ANGRPH MLTIFRAME: CPT | Performed by: OPHTHALMOLOGY

## 2023-12-08 PROCEDURE — 92134 CPTRZ OPH DX IMG PST SGM RTA: CPT | Performed by: OPHTHALMOLOGY

## 2023-12-08 ASSESSMENT — INTRAOCULAR PRESSURE
OD: 15
OS: 21

## 2024-02-02 ENCOUNTER — OFFICE VISIT (OUTPATIENT)
Dept: URBAN - METROPOLITAN AREA CLINIC 71 | Facility: CLINIC | Age: 69
End: 2024-02-02
Payer: MEDICARE

## 2024-02-02 PROCEDURE — 99214 OFFICE O/P EST MOD 30 MIN: CPT | Performed by: OPHTHALMOLOGY

## 2024-02-02 PROCEDURE — 92134 CPTRZ OPH DX IMG PST SGM RTA: CPT | Performed by: OPHTHALMOLOGY

## 2024-02-02 PROCEDURE — 92250 FUNDUS PHOTOGRAPHY W/I&R: CPT | Performed by: OPHTHALMOLOGY

## 2024-02-02 ASSESSMENT — INTRAOCULAR PRESSURE
OD: 7
OS: 9

## 2024-02-09 ENCOUNTER — OFFICE VISIT (OUTPATIENT)
Dept: URBAN - METROPOLITAN AREA CLINIC 71 | Facility: CLINIC | Age: 69
End: 2024-02-09

## 2024-02-09 DIAGNOSIS — H52.4 PRESBYOPIA: Primary | ICD-10-CM

## 2024-02-09 ASSESSMENT — VISUAL ACUITY
OD: 20/25
OS: 20/25

## 2024-02-09 ASSESSMENT — INTRAOCULAR PRESSURE
OD: 10
OS: 12

## 2024-04-04 ENCOUNTER — OFFICE VISIT (OUTPATIENT)
Dept: URBAN - METROPOLITAN AREA CLINIC 71 | Facility: CLINIC | Age: 69
End: 2024-04-04
Payer: MEDICARE

## 2024-04-04 DIAGNOSIS — H34.8320 TRIBUTARY (BRANCH) RETINAL VEIN OCCLUSION, LEFT EYE, WITH MACULAR EDEMA: Primary | ICD-10-CM

## 2024-04-04 PROCEDURE — 92134 CPTRZ OPH DX IMG PST SGM RTA: CPT | Performed by: OPHTHALMOLOGY

## 2024-04-04 PROCEDURE — 99214 OFFICE O/P EST MOD 30 MIN: CPT | Performed by: OPHTHALMOLOGY

## 2024-04-04 PROCEDURE — 92235 FLUORESCEIN ANGRPH MLTIFRAME: CPT | Performed by: OPHTHALMOLOGY

## 2024-04-04 ASSESSMENT — INTRAOCULAR PRESSURE
OS: 14
OD: 14

## 2024-06-06 ENCOUNTER — OFFICE VISIT (OUTPATIENT)
Dept: URBAN - METROPOLITAN AREA CLINIC 71 | Facility: CLINIC | Age: 69
End: 2024-06-06
Payer: MEDICARE

## 2024-06-06 DIAGNOSIS — H35.372 PUCKERING OF MACULA, LEFT EYE: ICD-10-CM

## 2024-06-06 DIAGNOSIS — H34.8320 TRIBUTARY (BRANCH) RETINAL VEIN OCCLUSION, LEFT EYE, WITH MACULAR EDEMA: Primary | ICD-10-CM

## 2024-06-06 DIAGNOSIS — H43.812 VITREOUS DEGENERATION, LEFT EYE: ICD-10-CM

## 2024-06-06 PROCEDURE — 92134 CPTRZ OPH DX IMG PST SGM RTA: CPT | Performed by: OPHTHALMOLOGY

## 2024-06-06 PROCEDURE — 92014 COMPRE OPH EXAM EST PT 1/>: CPT | Performed by: OPHTHALMOLOGY

## 2024-06-06 ASSESSMENT — INTRAOCULAR PRESSURE
OD: 12
OS: 13

## 2025-01-20 NOTE — IMPRESSION/PLAN
- defer to primary team    Impression: Other corneal scars and opacities: H17.89. s/p LASIK OU Plan: monitor

## 2025-01-23 ENCOUNTER — OFFICE VISIT (OUTPATIENT)
Dept: URBAN - METROPOLITAN AREA CLINIC 71 | Facility: CLINIC | Age: 70
End: 2025-01-23
Payer: MEDICARE

## 2025-01-23 DIAGNOSIS — H35.372 PUCKERING OF MACULA, LEFT EYE: ICD-10-CM

## 2025-01-23 DIAGNOSIS — H25.13 AGE-RELATED NUCLEAR CATARACT, BILATERAL: ICD-10-CM

## 2025-01-23 DIAGNOSIS — H34.8322 TRIBUTARY (BRANCH) RETINAL VEIN OCCLUSION, LEFT EYE, STABLE: Primary | ICD-10-CM

## 2025-01-23 PROCEDURE — 92014 COMPRE OPH EXAM EST PT 1/>: CPT | Performed by: OPHTHALMOLOGY

## 2025-01-23 PROCEDURE — 92134 CPTRZ OPH DX IMG PST SGM RTA: CPT | Performed by: OPHTHALMOLOGY

## 2025-01-23 ASSESSMENT — INTRAOCULAR PRESSURE
OS: 13
OD: 13

## 2025-02-12 ENCOUNTER — OFFICE VISIT (OUTPATIENT)
Dept: URBAN - METROPOLITAN AREA CLINIC 71 | Facility: CLINIC | Age: 70
End: 2025-02-12
Payer: MEDICARE

## 2025-02-12 DIAGNOSIS — H43.813 VITREOUS DEGENERATION, BILATERAL: Primary | ICD-10-CM

## 2025-02-12 PROCEDURE — 99213 OFFICE O/P EST LOW 20 MIN: CPT

## 2025-02-12 ASSESSMENT — INTRAOCULAR PRESSURE
OD: 11
OS: 13

## 2025-02-19 ENCOUNTER — OFFICE VISIT (OUTPATIENT)
Dept: URBAN - METROPOLITAN AREA CLINIC 71 | Facility: CLINIC | Age: 70
End: 2025-02-19
Payer: COMMERCIAL

## 2025-02-19 DIAGNOSIS — H35.372 PUCKERING OF MACULA, LEFT EYE: ICD-10-CM

## 2025-02-19 DIAGNOSIS — H43.813 VITREOUS DEGENERATION, BILATERAL: Primary | ICD-10-CM

## 2025-02-19 DIAGNOSIS — H34.8322 TRIBUTARY (BRANCH) RETINAL VEIN OCCLUSION, LEFT EYE, STABLE: ICD-10-CM

## 2025-02-19 DIAGNOSIS — H43.11 VITREOUS HEMORRHAGE, RIGHT EYE: ICD-10-CM

## 2025-02-19 DIAGNOSIS — H04.123 DRY EYE SYNDROME OF BILATERAL LACRIMAL GLANDS: ICD-10-CM

## 2025-02-19 DIAGNOSIS — H25.813 COMBINED FORMS OF AGE-RELATED CATARACT, BILATERAL: ICD-10-CM

## 2025-02-19 PROCEDURE — 99214 OFFICE O/P EST MOD 30 MIN: CPT | Performed by: OPHTHALMOLOGY

## 2025-02-19 ASSESSMENT — VISUAL ACUITY
OD: 20/20
OS: 20/25

## 2025-02-19 ASSESSMENT — INTRAOCULAR PRESSURE
OS: 12
OD: 12

## 2025-03-06 ENCOUNTER — TECH ONLY (OUTPATIENT)
Dept: URBAN - METROPOLITAN AREA CLINIC 71 | Facility: CLINIC | Age: 70
End: 2025-03-06
Payer: COMMERCIAL

## 2025-03-06 DIAGNOSIS — H25.813 COMBINED FORMS OF AGE-RELATED CATARACT, BILATERAL: Primary | ICD-10-CM

## 2025-03-07 ENCOUNTER — OFFICE VISIT (OUTPATIENT)
Dept: URBAN - METROPOLITAN AREA CLINIC 71 | Facility: CLINIC | Age: 70
End: 2025-03-07
Payer: COMMERCIAL

## 2025-03-07 DIAGNOSIS — H35.372 PUCKERING OF MACULA, LEFT EYE: ICD-10-CM

## 2025-03-07 DIAGNOSIS — H43.813 VITREOUS DEGENERATION, BILATERAL: Primary | ICD-10-CM

## 2025-03-07 ASSESSMENT — INTRAOCULAR PRESSURE
OD: 10
OS: 12

## 2025-05-12 ENCOUNTER — OFFICE VISIT (OUTPATIENT)
Dept: URBAN - METROPOLITAN AREA CLINIC 71 | Facility: CLINIC | Age: 70
End: 2025-05-12

## 2025-05-12 DIAGNOSIS — H52.4 PRESBYOPIA: Primary | ICD-10-CM

## 2025-05-12 ASSESSMENT — INTRAOCULAR PRESSURE
OS: 11
OD: 12

## 2025-05-12 ASSESSMENT — VISUAL ACUITY
OS: 20/25
OD: 20/20

## 2025-06-12 ENCOUNTER — OFFICE VISIT (OUTPATIENT)
Dept: URBAN - METROPOLITAN AREA CLINIC 71 | Facility: CLINIC | Age: 70
End: 2025-06-12
Payer: MEDICARE

## 2025-06-12 DIAGNOSIS — H43.813 VITREOUS DEGENERATION, BILATERAL: Primary | ICD-10-CM

## 2025-06-12 PROCEDURE — 99213 OFFICE O/P EST LOW 20 MIN: CPT

## 2025-06-12 ASSESSMENT — INTRAOCULAR PRESSURE
OS: 14
OD: 10

## 2025-07-24 ENCOUNTER — OFFICE VISIT (OUTPATIENT)
Dept: URBAN - METROPOLITAN AREA CLINIC 71 | Facility: CLINIC | Age: 70
End: 2025-07-24
Payer: MEDICARE

## 2025-07-24 DIAGNOSIS — H43.813 VITREOUS DEGENERATION, BILATERAL: ICD-10-CM

## 2025-07-24 DIAGNOSIS — H53.2 DIPLOPIA: ICD-10-CM

## 2025-07-24 PROCEDURE — 99213 OFFICE O/P EST LOW 20 MIN: CPT

## 2025-07-24 ASSESSMENT — INTRAOCULAR PRESSURE
OS: 8
OD: 11

## 2025-07-25 ENCOUNTER — OFFICE VISIT (OUTPATIENT)
Dept: URBAN - METROPOLITAN AREA CLINIC 71 | Facility: CLINIC | Age: 70
End: 2025-07-25
Payer: MEDICARE

## 2025-07-25 DIAGNOSIS — H53.8 OTHER VISUAL DISTURBANCES: Primary | ICD-10-CM

## 2025-07-25 PROCEDURE — 92083 EXTENDED VISUAL FIELD XM: CPT

## 2025-07-25 PROCEDURE — 99213 OFFICE O/P EST LOW 20 MIN: CPT

## 2025-07-25 ASSESSMENT — INTRAOCULAR PRESSURE: OD: 13

## 2025-08-28 ENCOUNTER — OFFICE VISIT (OUTPATIENT)
Dept: URBAN - METROPOLITAN AREA CLINIC 71 | Facility: CLINIC | Age: 70
End: 2025-08-28
Payer: MEDICARE

## 2025-08-28 DIAGNOSIS — H35.372 PUCKERING OF MACULA, LEFT EYE: ICD-10-CM

## 2025-08-28 DIAGNOSIS — H34.8322 TRIBUTARY (BRANCH) RETINAL VEIN OCCLUSION, LEFT EYE, STABLE: Primary | ICD-10-CM

## 2025-08-28 PROCEDURE — 92235 FLUORESCEIN ANGRPH MLTIFRAME: CPT | Performed by: OPHTHALMOLOGY

## 2025-08-28 PROCEDURE — 92134 CPTRZ OPH DX IMG PST SGM RTA: CPT | Performed by: OPHTHALMOLOGY

## 2025-08-28 PROCEDURE — 92014 COMPRE OPH EXAM EST PT 1/>: CPT | Performed by: OPHTHALMOLOGY

## 2025-08-28 ASSESSMENT — INTRAOCULAR PRESSURE
OS: 8
OD: 17